# Patient Record
Sex: MALE | Race: ASIAN | URBAN - METROPOLITAN AREA
[De-identification: names, ages, dates, MRNs, and addresses within clinical notes are randomized per-mention and may not be internally consistent; named-entity substitution may affect disease eponyms.]

---

## 2024-08-28 ENCOUNTER — APPOINTMENT (OUTPATIENT)
Dept: URBAN - METROPOLITAN AREA CLINIC 256 | Age: 24
Setting detail: DERMATOLOGY
End: 2024-08-29

## 2024-08-28 VITALS — WEIGHT: 210 LBS | HEIGHT: 64 IN

## 2024-08-28 DIAGNOSIS — L90.5 SCAR CONDITIONS AND FIBROSIS OF SKIN: ICD-10-CM

## 2024-08-28 PROCEDURE — 99202 OFFICE O/P NEW SF 15 MIN: CPT

## 2024-08-28 PROCEDURE — OTHER DEFER: OTHER

## 2024-08-28 PROCEDURE — OTHER COUNSELING: OTHER

## 2024-08-28 PROCEDURE — OTHER PHOTO-DOCUMENTATION: OTHER

## 2024-08-28 PROCEDURE — OTHER MIPS QUALITY: OTHER

## 2024-08-28 PROCEDURE — OTHER PATIENT SPECIFIC COUNSELING: OTHER

## 2024-08-28 NOTE — PROCEDURE: DEFER
Reason To Defer Override: deferring to Casi Leyva at [id number]
Detail Level: Detailed
Size Of Lesion In Cm (Optional): 0
Introduction Text (Please End With A Colon): The following procedure was deferred:

## 2024-08-28 NOTE — PROCEDURE: PATIENT SPECIFIC COUNSELING
Detail Level: Zone
Recommended Fraxel laser to the patient for treatment. Advised there are no topicals or OTC treatments that will be adequate. Reviewed risks of hyper or hypopigmentation with darker skin tones. Discussed Fraxel would be a cosmetic treatment.